# Patient Record
Sex: FEMALE | Race: OTHER | Employment: FULL TIME | URBAN - METROPOLITAN AREA
[De-identification: names, ages, dates, MRNs, and addresses within clinical notes are randomized per-mention and may not be internally consistent; named-entity substitution may affect disease eponyms.]

---

## 2024-05-21 ENCOUNTER — HOSPITAL ENCOUNTER (OUTPATIENT)
Dept: MRI IMAGING | Age: 49
Discharge: HOME | End: 2024-05-23
Attending: PODIATRIST
Payer: COMMERCIAL

## 2024-05-21 DIAGNOSIS — M79.672 LEFT FOOT PAIN: ICD-10-CM

## 2024-05-21 DIAGNOSIS — M84.375A STRESS FRACTURE OF LEFT CALCANEUS: ICD-10-CM

## 2024-05-21 PROCEDURE — 73721 MRI JNT OF LWR EXTRE W/O DYE: CPT

## 2024-05-23 ENCOUNTER — TELEPHONE (OUTPATIENT)
Dept: ADMINISTRATIVE | Age: 49
End: 2024-05-23

## 2024-05-23 NOTE — TELEPHONE ENCOUNTER
Patient is needing a follow up on mri the soonest opening is not until July. Can she be seen earlier to get results

## 2024-06-10 ENCOUNTER — OFFICE VISIT (OUTPATIENT)
Dept: PODIATRY | Age: 49
End: 2024-06-10
Payer: COMMERCIAL

## 2024-06-10 VITALS — HEIGHT: 64 IN | BODY MASS INDEX: 34.15 KG/M2 | WEIGHT: 200 LBS

## 2024-06-10 DIAGNOSIS — M84.375A STRESS FRACTURE OF LEFT CALCANEUS: ICD-10-CM

## 2024-06-10 DIAGNOSIS — M79.672 LEFT FOOT PAIN: Primary | ICD-10-CM

## 2024-06-10 PROCEDURE — 99214 OFFICE O/P EST MOD 30 MIN: CPT | Performed by: PODIATRIST

## 2024-06-10 RX ORDER — PREDNISONE 10 MG/1
TABLET ORAL
Qty: 20 TABLET | Refills: 0 | Status: SHIPPED | OUTPATIENT
Start: 2024-06-10

## 2024-06-10 NOTE — PROGRESS NOTES
tolerate the pressure that you are putting upon it. Perform each one 10 times in a row:   Stand on the injured foot while lifting the uninjured foot off the ground.  Hold the position for 15 seconds.  Relax and put your weight back onto your uninjured foot.  Checking in with your PT may be necessary to be sure you are doing the right exercises for your ankle.  Full Weight-Bearing Standing Calf Raises     Once you are cleared for full weight bearing, you may do these calf raises:   Stand on the injured foot while lifting the uninjured foot off the ground.  Raise up, standing only on the ball of the injured foot and lifting your heel off the ground.  Hold the position for 15 seconds.  Relax and put your weight back onto your uninjured foot.  Full Weight-Bearing Lateral Stepping     Increase the speed of this exercise as your healing progresses:   Place a rolled towel or short object on the ground to the side of your injured foot.  Step over the towel with the injured foot and remain on that foot.  Then bring the uninjured foot over the object and stand on both feet.  Step back over the towel with the uninjured foot and remain on that foot.  Then bring the injured foot back over the towel and stand on both feet.  Full Weight-Bearing Lateral Jump     This exercise starts to incorporate plyometrics into your rehab routine, which can help you get back to running and sports.   Increase the speed of this exercise as your healing progresses:   Place a rolled towel or short object on the ground to the side of your injured foot.  Hop over the towel and land on the injured foot.  Then hop back over the towel and land on the uninjured foot.  Single Leg Stance on a Towel     Injury to ankles can often result in decreased balance ability.2? Towards the end of rehabilitation, performing balance activities is an important way to prevent future injury. Perform this exercise 10 times in a row:   Fold a towel into a small rectangle and